# Patient Record
Sex: MALE | Race: BLACK OR AFRICAN AMERICAN | NOT HISPANIC OR LATINO | URBAN - METROPOLITAN AREA
[De-identification: names, ages, dates, MRNs, and addresses within clinical notes are randomized per-mention and may not be internally consistent; named-entity substitution may affect disease eponyms.]

---

## 2018-01-22 ENCOUNTER — EMERGENCY (EMERGENCY)
Facility: HOSPITAL | Age: 43
LOS: 1 days | Discharge: HOME | End: 2018-01-22
Admitting: EMERGENCY MEDICINE
Payer: MEDICAID

## 2018-01-22 VITALS
TEMPERATURE: 99 F | OXYGEN SATURATION: 100 % | RESPIRATION RATE: 18 BRPM | SYSTOLIC BLOOD PRESSURE: 120 MMHG | DIASTOLIC BLOOD PRESSURE: 59 MMHG | HEART RATE: 88 BPM

## 2018-01-22 PROCEDURE — 99283 EMERGENCY DEPT VISIT LOW MDM: CPT

## 2018-01-22 RX ORDER — AMOXICILLIN 250 MG/5ML
1 SUSPENSION, RECONSTITUTED, ORAL (ML) ORAL
Qty: 20 | Refills: 0 | OUTPATIENT
Start: 2018-01-22 | End: 2018-01-31

## 2018-01-22 RX ADMIN — Medication 1 TABLET(S): at 22:37

## 2018-01-22 NOTE — ED PROVIDER NOTE - CARE PLAN
Principal Discharge DX:	Strep pharyngitis  Assessment and plan of treatment:	pls rest, drink plenty of warm fluids, take yoru abx as prescribed, tylenol/motrin for pain/fever as needed, f/u with pmd, return for any worsening symptoms or any other concerning symptoms

## 2018-01-22 NOTE — ED PROVIDER NOTE - PLAN OF CARE
pls rest, drink plenty of warm fluids, take yoru abx as prescribed, tylenol/motrin for pain/fever as needed, f/u with pmd, return for any worsening symptoms or any other concerning symptoms

## 2018-01-22 NOTE — ED PROVIDER NOTE - OBJECTIVE STATEMENT
43 y/o male, with no PMHx, presents to the ED for fever, throat pain, and body aches x 2 days. Sick contact at home with possible influenzae. Hx of strep throat in 2016. Denies headache, neck pain, visual disturbances, fevers, chills, nausea, vomiting, cough, chest pain, sob, abdominal pain, urinary symptoms, saddle anesthesia, loss of bowel/bladder, numbness/weakness/tingling, recent travel, sick contact, social history. 43 y/o male, with no PMHx, presents to the ED for fever, throat pain, and body aches x 2 days. Sick contact at home with possible influenzae. Hx of strep throat in 2016. Denies headache, neck pain, visual disturbances, nausea, vomiting, cough, chest pain, sob, abdominal pain, urinary symptoms, saddle anesthesia, loss of bowel/bladder, numbness/weakness/tingling, recent travel, sick contact, social history.

## 2018-01-22 NOTE — ED PROVIDER NOTE - CHPI ED SYMPTOMS NEG
no decreased eating/drinking/no cough/no rash/no shortness of breath/no diarrhea/no chills/no vomiting/no abdominal pain/no headache

## 2018-01-22 NOTE — ED ADULT TRIAGE NOTE - CHIEF COMPLAINT QUOTE
fever , headache and sore throat since Sunday AM. pt says he has difficulty eating due to burning pain in his throat.  No PMH.  NAD
